# Patient Record
Sex: MALE | Race: WHITE
[De-identification: names, ages, dates, MRNs, and addresses within clinical notes are randomized per-mention and may not be internally consistent; named-entity substitution may affect disease eponyms.]

---

## 2017-06-07 NOTE — P.GSHP
History of Present Illness


H&P Date: 06/07/17


Chief Complaint: Left inguinal hernia





This is a 66-year-old male who's had complaints of left groin pain.  He was 

seen Liu found to have a left inguinal hernia.





- Constitutional


Constitutional: Reports as per HPI





Past Medical History


Past Medical History: Hyperlipidemia, Hypertension


Additional Past Medical History / Comment(s): HERNIA


History of Any Multi-Drug Resistant Organisms: None Reported


Past Surgical History: Back Surgery, Hernia Repair, Orthopedic Surgery


Additional Past Surgical History / Comment(s): HERNIA REPAIR X 2.  COLONOSCOPY.

  EGD.  L4-L5 WITH RODS AND SCREWS.  BILAT ELBOW REPAIR


Past Anesthesia/Blood Transfusion Reactions: Motion Sickness


Past Psychological History: No Psychological Hx Reported


Smoking Status: Never smoker


Past Alcohol Use History: Occasional


Past Drug Use History: None Reported





- Past Family History


  ** Mother


Family Medical History: Deep Vein Thrombosis (DVT)





  ** Father


Family Medical History: Cancer





Medications and Allergies


 Home Medications











 Medication  Instructions  Recorded  Confirmed  Type


 


Lisinopril-Hctz 20-25 mg 1 tab PO DAILY 06/06/17 06/06/17 History





[Zestoretic 20-25]    


 


Pravastatin Sodium [Pravachol] 40 mg PO DAILY 06/06/17 06/06/17 History











 Allergies











Allergy/AdvReac Type Severity Reaction Status Date / Time


 


No Known Allergies Allergy   Verified 06/07/17 12:03














Surgical - Exam


 Vital Signs











Temp Pulse Resp BP


 


 97.8 F   50 L  16   124/75 


 


 06/07/17 12:27  06/07/17 12:27  06/07/17 12:27  06/07/17 12:27














- General


well developed, no distress





- Eyes


PERRL





- ENT


normal pinna





- Neck


no masses





- Respiratory


normal expansion





- Cardiovascular


Rhythm: regular





- Abdomen


Abdomen: soft, non tender


Hernia: inguinal (Reducible left inguinal hernia)





Results





- Labs





 06/07/17 12:20


 Diabetes panel











  06/07/17 Range/Units





  12:20 


 


Potassium  4.8  (3.5-5.1)  mmol/L








 Pituitary panel











  06/07/17 Range/Units





  12:20 


 


Potassium  4.8  (3.5-5.1)  mmol/L








 Adrenal panel











  06/07/17 Range/Units





  12:20 


 


Potassium  4.8  (3.5-5.1)  mmol/L














Assessment and Plan


Plan: 





Left renal hernia.  We'll perform laparoscopic robotic-assisted repair.

## 2017-06-07 NOTE — P.OP
Date of Procedure: 06/07/17


Preoperative Diagnosis: 


Left inguinal hernia


Postoperative Diagnosis: 


Umbilical hernia





Left inguinal hernia


Procedure(s) Performed: 


Laparoscopic robotic system repair of left inguinal hernia





Laparoscopic repair of umbilical hernia


Implants: 





Anesthesia: DEXTERA


Surgeon: Arie Lund


Estimated Blood Loss (ml): 5


Pathology: none sent


Condition: stable


Disposition: PACU


Indications for Procedure: 





Operative Findings: 





Description of Procedure: 


The patient's placed on the operating table in the supine position.  The 

patient received general anesthesia.  The patient's abdomen was prepped and 

draped in usual sterile fashion.  The skin was anesthetized 1% local Xylocaine 

at the incision sites.  Using an 11 blade a skin incision was made at the 

umbilicus.  There was a small local hernia seen.  The fascia was grasped with a 

Jose and then the peritoneal cavity was entered with the Veress needle.  

Position of the Veress needle was confirmed with a positive drop test.  After 

adequate insufflation a 5 mm trocar was placed into the peritoneal cavity.


The Laparoscope was placed the peritoneal cavity.  And a robotic 8 mm trocar 

was placed in the right lateral position and then another 8 mm robotic trochars 

placed in the left lateral position.  The original 5 mm trocar was exchanged 

for a 12 mm trocar.  The patient was placed in reverse Trendelenburg and then 

the patient was docked to the robot.





Next the peritoneum over top of the hernia was incised and then using blunt and 

sharp dissection and electrocautery the hernia sac was dissected free from the 

floor of the inguinal canal.  The hernia sac was completely reduced into the 

peritoneal cavity.  And then using the Pro  mesh the hernia was repaired.  

The peritoneum was then sutured with 20V lock suture.  The patient was then 

undocked the robot.  The needle was withdrawn from the peritoneal cavity.  The 

umbilical hernia/ trocar site was closed with 0 Ethibond suture.  The skin was 

closed interrupted 3-0 Monocryl suture.  Dermabond dressing was applied.  

Patient was sent to recovery in stable condition.

## 2017-06-09 NOTE — ED
Abdominal Pain HPI





- General


Source: patient, family


Mode of arrival: ambulatory


Limitations: no limitations





<Bacilio Salazar - Last Filed: 06/09/17 22:53>





<Raymundo Brand - Last Filed: 06/10/17 00:27>





- General


Chief Complaint: Abdominal Pain


Stated Complaint: Post op hernia pain


Time Seen by Provider: 06/09/17 21:21





- History of Present Illness


Initial Comments: 





This 66-year-old white male presents with a complaint of some abdominal 

cramping.  He feels very constipated.  He states that he has not had a bowel 

movement in 3 days.  He just had hernia surgery 2 days ago.  He's been taking 

some Norco for post surgical pain.  He has tried some prune juice as well as 

some fruit and sauerkraut but denies taking any laxatives or other medication 

for constipation.  He denies any history of problems with his bowel movements 

other than one time previously after he had back surgery.  He has had some 

minimal nausea but no vomiting.  There is been no fevers or chills.  He denies 

any urinary symptoms.  He apparently had an umbilical hernia repaired as well 

as an inguinal hernia.  No other complaints or modifying factors.  This was 

done at our hospital by Dr. Lund. (Bacilio Salazar)





- Related Data


 Home Medications











 Medication  Instructions  Recorded  Confirmed


 


Lisinopril-Hctz 20-25 mg 1 tab PO DAILY 06/06/17 06/09/17





[Zestoretic 20-25]   


 


Pravastatin Sodium [Pravachol] 40 mg PO DAILY 06/06/17 06/09/17


 


HYDROcodone/APAP 7.5-325MG [Norco 1 tab PO Q4H PRN 06/09/17 06/09/17





7.5]   


 


Omeprazole 40 mg PO DAILY 06/09/17 06/09/17








 Previous Rx's











 Medication  Instructions  Recorded


 


Docusate [Colace] 100 mg PO BID #20 capsule 06/07/17











 Allergies











Allergy/AdvReac Type Severity Reaction Status Date / Time


 


No Known Allergies Allergy   Verified 06/09/17 21:29














Review of Systems


ROS Other: All systems not noted in ROS Statement are negative.





<Bacilio aSlazar - Last Filed: 06/09/17 22:53>


ROS Other: All systems not noted in ROS Statement are negative.





<Raymundo Brand - Last Filed: 06/10/17 00:27>


ROS Statement: 


Those systems with pertinent positive or pertinent negative responses have been 

documented in the HPI.








Past Medical History


Past Medical History: Hyperlipidemia, Hypertension


Additional Past Medical History / Comment(s): HERNIA


History of Any Multi-Drug Resistant Organisms: None Reported


Past Surgical History: Back Surgery, Hernia Repair, Orthopedic Surgery


Additional Past Surgical History / Comment(s): HERNIA REPAIR X 2.  COLONOSCOPY.

  EGD.  L4-L5 WITH RODS AND SCREWS.  BILAT ELBOW REPAIR


Past Anesthesia/Blood Transfusion Reactions: Motion Sickness


Past Psychological History: No Psychological Hx Reported


Smoking Status: Never smoker


Past Alcohol Use History: Occasional


Past Drug Use History: None Reported





- Past Family History


  ** Mother


Family Medical History: Deep Vein Thrombosis (DVT)





  ** Father


Family Medical History: Cancer





<Bacilio Salazar - Last Filed: 06/09/17 22:53>





General Exam


Limitations: no limitations





<Bacilio Salazar - Last Filed: 06/09/17 22:53>





<Raymundo Brand - Last Filed: 06/10/17 00:27>





- General Exam Comments


Initial Comments: 





GENERAL: The patient is well nourished and well hydrated. 


VITAL SIGNS: Heart rate, blood pressure, respiratory rate reviewed as recorded 

in nurse's notes. 


EYES: Pupils are round and reactive. Extraocular movements are intact. No 

conjunctival / lid redness or swelling. 


ENT: No external evidence of injury, swelling, or ecchymosis. Airway is patent. 

Throat is clear. 


NECK: Nontender. No swelling or evidence of injury. No subcutaneous emphysema. 

Trachea is midline. No thyroid mass. 


HEART: Regular rate and rhythm. Good peripheral pulses. 


LUNGS/CHEST: Breath sounds clear and equal bilaterally. No rales, rhonchi, or 

wheezes. No ecchymosis, subcutaneous emphysema, or tenderness. 


ABDOMEN: Is some mild tenderness present diffusely throughout the abdomen.  

There is well-healing surgical scars noted horizontally 3.  There are 2 

lateral scars as well as one at the umbilicus.  There is some mild associated 

erythema around the umbilical incision.  There is no drainage noted.  No 

palpable masses or organomegaly. No peritoneal signs.


EXTREMITIES: No extremity tenderness. Normal muscle tone and function. No 

thoracolumbar tenderness. 


NEUROLOGIC: Sensation is grossly intact. Cranial nerve exam reveals face is 

symmetrical, tongue is midline, speech is clear. 


SKIN: No abrasions or ecchymosis is noted. No induration or masses noted. 


PSYCHIATRIC: Alert and oriented. Appropriate behavior and judgment. (Bcailio Salazar)





Medical Decision Making





- Lab Data


Result diagrams: 


 06/09/17 22:00





 06/09/17 22:00





<Bacilio Salazar - Last Filed: 06/09/17 22:53>





- Lab Data


Result diagrams: 


 06/09/17 22:00





 06/09/17 22:00





<Raymundo Brand - Last Filed: 06/10/17 00:27>





- Medical Decision Making





The patient was seen and examined.  All diagnostics were reviewed.  An enema, 

magnesium citrate, Dulcolax are ordered.  He refuses any pain medications at 

this time.  The laboratory is reviewed and is unremarkable.  A computed 

tomography scan of the abdomen and pelvis is currently pending.  Further care 

will be passed on to oncoming physician. (Bacilio Salazar)





- Lab Data





 Lab Results











  06/09/17 06/09/17 06/09/17 Range/Units





  22:00 22:00 22:00 


 


WBC   8.8   (3.8-10.6)  k/uL


 


RBC   4.31   (4.30-5.90)  m/uL


 


Hgb   14.3   (13.0-17.5)  gm/dL


 


Hct   42.0   (39.0-53.0)  %


 


MCV   97.5   (80.0-100.0)  fL


 


MCH   33.1   (25.0-35.0)  pg


 


MCHC   34.0   (31.0-37.0)  g/dL


 


RDW   12.9   (11.5-15.5)  %


 


Plt Count   204   (150-450)  k/uL


 


Neutrophils %   79   %


 


Lymphocytes %   13   %


 


Monocytes %   5   %


 


Eosinophils %   2   %


 


Basophils %   0   %


 


Neutrophils #   6.9   (1.3-7.7)  k/uL


 


Lymphocytes #   1.1   (1.0-4.8)  k/uL


 


Monocytes #   0.4   (0-1.0)  k/uL


 


Eosinophils #   0.2   (0-0.7)  k/uL


 


Basophils #   0.0   (0-0.2)  k/uL


 


PT     (9.0-12.0)  sec


 


INR     (<1.1)  


 


APTT     (22.0-30.0)  sec


 


Sodium  139    (137-145)  mmol/L


 


Potassium  3.7    (3.5-5.1)  mmol/L


 


Chloride  104    ()  mmol/L


 


Carbon Dioxide  26    (22-30)  mmol/L


 


Anion Gap  9    mmol/L


 


BUN  14    (9-20)  mg/dL


 


Creatinine  0.77    (0.66-1.25)  mg/dL


 


Est GFR (MDRD) Af Amer  >60    (>60 ml/min/1.73 sqM)  


 


Est GFR (MDRD) Non-Af  >60    (>60 ml/min/1.73 sqM)  


 


Glucose  107 H    (74-99)  mg/dL


 


Plasma Lactic Acid Hernan    1.0  (0.7-2.0)  mmol/L


 


Calcium  9.3    (8.4-10.2)  mg/dL


 


Total Bilirubin  0.6    (0.2-1.3)  mg/dL


 


AST  20    (17-59)  U/L


 


ALT  24    (21-72)  U/L


 


Alkaline Phosphatase  70    ()  U/L


 


Total Protein  6.7    (6.3-8.2)  g/dL


 


Albumin  3.8    (3.5-5.0)  g/dL


 


Amylase  43    ()  U/L


 


Lipase  34    ()  U/L


 


Urine Color     


 


Urine Appearance     (Clear)  


 


Urine pH     (5.0-8.0)  


 


Ur Specific Gravity     (1.001-1.035)  


 


Urine Protein     (Negative)  


 


Urine Glucose (UA)     (Negative)  


 


Urine Ketones     (Negative)  


 


Urine Blood     (Negative)  


 


Urine Nitrite     (Negative)  


 


Urine Bilirubin     (Negative)  


 


Urine Urobilinogen     (<2.0)  mg/dL


 


Ur Leukocyte Esterase     (Negative)  














  06/09/17 06/09/17 Range/Units





  22:00 23:30 


 


WBC    (3.8-10.6)  k/uL


 


RBC    (4.30-5.90)  m/uL


 


Hgb    (13.0-17.5)  gm/dL


 


Hct    (39.0-53.0)  %


 


MCV    (80.0-100.0)  fL


 


MCH    (25.0-35.0)  pg


 


MCHC    (31.0-37.0)  g/dL


 


RDW    (11.5-15.5)  %


 


Plt Count    (150-450)  k/uL


 


Neutrophils %    %


 


Lymphocytes %    %


 


Monocytes %    %


 


Eosinophils %    %


 


Basophils %    %


 


Neutrophils #    (1.3-7.7)  k/uL


 


Lymphocytes #    (1.0-4.8)  k/uL


 


Monocytes #    (0-1.0)  k/uL


 


Eosinophils #    (0-0.7)  k/uL


 


Basophils #    (0-0.2)  k/uL


 


PT  10.6   (9.0-12.0)  sec


 


INR  1.0   (<1.1)  


 


APTT  24.6   (22.0-30.0)  sec


 


Sodium    (137-145)  mmol/L


 


Potassium    (3.5-5.1)  mmol/L


 


Chloride    ()  mmol/L


 


Carbon Dioxide    (22-30)  mmol/L


 


Anion Gap    mmol/L


 


BUN    (9-20)  mg/dL


 


Creatinine    (0.66-1.25)  mg/dL


 


Est GFR (MDRD) Af Amer    (>60 ml/min/1.73 sqM)  


 


Est GFR (MDRD) Non-Af    (>60 ml/min/1.73 sqM)  


 


Glucose    (74-99)  mg/dL


 


Plasma Lactic Acid Hernan    (0.7-2.0)  mmol/L


 


Calcium    (8.4-10.2)  mg/dL


 


Total Bilirubin    (0.2-1.3)  mg/dL


 


AST    (17-59)  U/L


 


ALT    (21-72)  U/L


 


Alkaline Phosphatase    ()  U/L


 


Total Protein    (6.3-8.2)  g/dL


 


Albumin    (3.5-5.0)  g/dL


 


Amylase    ()  U/L


 


Lipase    ()  U/L


 


Urine Color   Light Yellow  


 


Urine Appearance   Clear  (Clear)  


 


Urine pH   6.5  (5.0-8.0)  


 


Ur Specific Gravity   >1.050 H  (1.001-1.035)  


 


Urine Protein   Trace H  (Negative)  


 


Urine Glucose (UA)   Negative  (Negative)  


 


Urine Ketones   Negative  (Negative)  


 


Urine Blood   Negative  (Negative)  


 


Urine Nitrite   Negative  (Negative)  


 


Urine Bilirubin   Negative  (Negative)  


 


Urine Urobilinogen   <2.0  (<2.0)  mg/dL


 


Ur Leukocyte Esterase   Negative  (Negative)  














Disposition





<Bacilio Salazar - Last Filed: 06/09/17 22:53>


Time of Disposition: 00:27





<Raymundo Brand - Last Filed: 06/10/17 00:27>


Clinical Impression: 


 Abdominal pain, Constipation, H/O hernia repair





Disposition: HOME SELF-CARE


Instructions:  Abdominal Pain (ED), Constipation (ED)


Referrals: 


Laurent Calderon DO [Primary Care Provider] - 1-2 days

## 2018-06-26 NOTE — US
EXAMINATION TYPE: US venous doppler duplex LE LT

 

DATE OF EXAM: 6/26/2018 5:36 PM

 

COMPARISON: NONE

 

CLINICAL HISTORY: LLE R22.42 Swelling Left lower limb. Lump on left calf.

 

SIDE PERFORMED: Left  

 

TECHNIQUE:  The lower extremity deep venous system is examined utilizing real time linear array sonog
justo with graded compression, doppler sonography and color-flow sonography.

 

VESSELS IMAGED:

External Iliac Vein (EIV)

Common Femoral Vein

Deep Femoral Vein

Greater Saphenous Vein *

Femoral Vein

Popliteal Vein

Small Saphenous Vein *

Proximal Calf Veins

(* superficial vessels)

 

Left Leg:  Negative for DVT. Varicosities seen left calf area of lump. 

 

 

 

IMPRESSION:  No evidence of deep venous thrombosis. There are superficial varicosities in the area of
 the lump on the left calf.

## 2018-12-12 NOTE — OP
OPERATIVE REPORT



DATE OF SURGERY:

12/12/2018.



PROCEDURE PERFORMED:

Phacoemulsification of cataract and intraocular lens implant with the left eye.



PREOPERATIVE DIAGNOSES:

Nuclear sclerosis, cortical sclerosis, posterior subcapsular cataract.



POSTOPERATIVE DIAGNOSES:

Nuclear sclerosis, cortical sclerosis, posterior subcapsular cataract.



SURGEON:

Dr. Abdiel Sood.



ANESTHESIA:

Topical.



ESTIMATED BLOOD LOSS:

None.



SPECIMEN:

Taken none.



NARRATIVE:

After obtaining the appropriate consent, the patient was brought to the 
operating room.

There he was placed under cardiac monitoring, prepped and draped in the usual 
sterile

manner.  He was approached from the left temporal side using previously acquired

corneal topography information.  The axis of 157 degrees was identified and 
marked with

a corneal marking instrument.  Once this was completed, a 5.5 mm Suraj ring 
was placed

on the patient's central cornea.  At the 5 o'clock position, an MVR blade was 
used to

create a paracentesis port.  Through this opening 1% Xylocaine MPF 50 50 mix 
with

balanced salt solution was injected into the anterior chamber.  This was 
followed by

stabilization of the anterior chamber with Amvisc.  At the 3 o'clock position, 
a 2.7 mm

eliz keratome was used to create a self-sealing corneal flap incision in a

Langerman's  fashion.  Through this opening, a cystotome was used to begin a 
continuous

tear capsulorrhexis which was completed using the Utrata forceps.  Care was 
taken to

ensure that the diameter of the rhexis was at least the size of the ring placed 
on the

patient's cornea.  Hydrodissection and hydrodelineation of the lens was 
accomplished

with balanced salt solution.  Phacoemulsification lens utilizing phaco chop was

accomplished at 12.77 seconds at 6% power.  Additional Xylocaine MPF was 
instilled into

the patient's eye and removal of the remaining cortex was accomplished under 
irrigation

aspiration along with additional care polishing  the posterior capsule.  Amvisc 
was

then placed into the patient's eye and using the Stewart-Monica capsule 
polishers,  the

entire underside of the anterior capsule was cleaned from the equator to the 
edge of

the capsule, this was accomplished 360 degrees without any difficulty.  The 
internal

incision was enlarged slightly with a keratome and a Bausch and Lomb crystal 
lens true

line toric model BL1UT 18.0 diopter by 1.25 diopter intraocular lens was then 
placed

into the capsular bag without difficulty.  The remaining viscoelastic was 
removed from

the capsular bag and the lens was rotated to correspond to the previously placed

astigmatism marks placed on the patient's limbus.  Once the lens was secured, 
the

anterior chamber was then also cleared of any remaining viscoelastic.  The eye 
was

brought to normal intraocular pressures through the paracentesis port and to 
ensure

watertight integrity during the initial stages of healing, ReSure was placed on 
the

patient's eye, maintaining a watertight integrity and there were no 
complications.  He

then received 2 drops of 1/2 percent timolol followed by 2 drops of 1% atropine 
and 2

drops of moxifloxacin.  He was then lightly patched and shielded in the usual 
manner.

There were no complications from the procedure.  He tolerated the procedure 
well and

was returned to outpatient recovery in good condition.





MMKRISTIE / MALIK: [id number] / Job#: [id number]

MARCO

## 2018-12-12 NOTE — P.OP
Date of Procedure: 12/12/18


Preoperative Diagnosis: 


NS & CS & PSC & regular astigmatism


Postoperative Diagnosis: 


same


Procedure(s) Performed: 


PIOL, OS


Anesthesia: MAC


Surgeon: Abdiel Sood


Pathology: none sent


Condition: stable


Disposition: same day


Indications for Procedure: 


Blrru vision


Operative Findings: 


No complications

## 2019-07-10 NOTE — P.OP
Date of Procedure: 07/10/19


Preoperative Diagnosis: 


NS & CS & PSC


Postoperative Diagnosis: 


same


Procedure(s) Performed: 


PIOL, OD


Implants: 


AO1UV 19.25


Anesthesia: MAC


Surgeon: Abdiel Sood


Pathology: none sent


Condition: stable


Disposition: same day


Indications for Procedure: 


blurry vision


Operative Findings: 


No complications

## 2019-07-10 NOTE — OP
OPERATIVE REPORT



DATE OF SURGERY:

07/10/2019



PROCEDURE:

Phacoemulsification of cataract and intraocular lens implant of the right eye.



PREOPERATIVE DIAGNOSES:

1. Nuclear sclerosis, right eye.

2. Cortical sclerosis, right eye.

3. Posterior subcapsular cataract, right eye.



POSTOPERATIVE DIAGNOSES:

1. Nuclear sclerosis, right eye.

2. Cortical sclerosis, right eye.

3. Posterior subcapsular cataract, right eye.



NARRATIVE:

After obtaining the appropriate consent, the patient was brought to the operating room.

There the patient was placed under cardiac monitoring, prepped and draped in the usual

sterile manner.  The patient was approached from the right temporal side.  The _____ mm

Suraj ring inked in gentian violet was placed centrally on the cornea.  At the 11

o'clock position, a 1.1 mm keratome was used to create a paracentesis port.  Through

this opening, 1% Xylocaine MPF 50/50 mix with balanced salt solution was injected into

the anterior chamber.  This was followed by stabilization of the anterior chamber with

Amvisc viscoelastic.  At the 9 o'clock position, a 2.75 mm eliz keratome was used to

create a self-scaling corneal flap incision in a Langerman fashion.  Through this

opening, a cystotome was introduced to begin a continuous tear capsulorrhexis which was

completed using the Utrata forceps.  Care was taken to ensure that the capsulorrhexis

was at least the size of the martha on the anterior cornea.  Hydrodissection and

hydrodelineation of the lens was accomplished with balanced salt solution.

Phacoemulsification of the lens utilizing Phaco Chop was accomplished in 9.77 seconds

at 8% power.  Addition Xylocaine MPF was instilled into the anterior chamber.  This was

followed by removal of the remaining cortex under irrigation and aspiration along with

careful polishing of the posterior capsule in a capsule vacuum mode.  Additional Amvisc

viscoelastic was then used to stabilize the capsular bag, and the Bausch and Lomb

Crystalens model AO1UV 19.25 diopter intraocular lens was injected into the capsular

bag without difficult.  The lens was rotated 270 degrees so that the haptics resided at

the 6 and 12 o'clock positions, and all remaining viscoelastic was then removed from

within the capsular bag and around the anterior chamber.  The eye was brought to normal

intraocular pressure through the paracentesis port along with slight hydration of the

incision sites.  Watertight integrity was confirmed using a fluorescein strip.  The

patient then received 2 drops of 0.5% timolol followed by 2 drops of Vigamox and 2

drops of 1% atropine.  The patient was then lightly patched and shielded in the usual

manner. There was no complications from the procedure.  The patient tolerated the

procedure well and was returned to outpatient recovery in good  condition.





MMODL / BEEN: [id number] / Job#: [id number]

## 2021-07-19 ENCOUNTER — HOSPITAL ENCOUNTER (OUTPATIENT)
Dept: HOSPITAL 47 - ORWHC2ENDO | Age: 71
Discharge: HOME | End: 2021-07-19
Attending: INTERNAL MEDICINE
Payer: OTHER GOVERNMENT

## 2021-07-19 VITALS — SYSTOLIC BLOOD PRESSURE: 117 MMHG | RESPIRATION RATE: 18 BRPM | HEART RATE: 53 BPM | DIASTOLIC BLOOD PRESSURE: 66 MMHG

## 2021-07-19 VITALS — TEMPERATURE: 98.4 F

## 2021-07-19 VITALS — BODY MASS INDEX: 29.6 KG/M2

## 2021-07-19 DIAGNOSIS — K64.8: ICD-10-CM

## 2021-07-19 DIAGNOSIS — Z86.010: ICD-10-CM

## 2021-07-19 DIAGNOSIS — I10: ICD-10-CM

## 2021-07-19 DIAGNOSIS — K57.90: ICD-10-CM

## 2021-07-19 DIAGNOSIS — Z79.899: ICD-10-CM

## 2021-07-19 DIAGNOSIS — G47.33: ICD-10-CM

## 2021-07-19 DIAGNOSIS — E78.5: ICD-10-CM

## 2021-07-19 DIAGNOSIS — Z12.11: Primary | ICD-10-CM

## 2021-07-19 DIAGNOSIS — K21.9: ICD-10-CM

## 2021-07-19 PROCEDURE — 45378 DIAGNOSTIC COLONOSCOPY: CPT

## 2021-07-19 NOTE — P.PCN
Date of Procedure: 07/19/21


Description of Procedure: 





BRIEF HISTORY: 


Patient is a 70-year-old male presenting for outpatient colonoscopy for history 

of colon polyps.  The patient reports multiple colonoscopies in the past.  He 

believes his last was between 5-10 years ago but is unsure of the day.  He does 

report colon cancer in his fall.  No change in bowel habits.





PROCEDURE PERFORMED: 


Colonoscopy. 





PREOPERATIVE DIAGNOSIS: 


History of colon polyps, patient believes last colonoscopy 5-10 years ago, he 

reports family history of colon cancer in his father. 





ESTIMATED BLOOD LOSS: 


Minimal.





IV sedation per Anesthesia. 





PROCEDURE: 


After informed consent was obtained, the patient, was brought into the endoscopy

unit. IV sedation was administered by Anesthesia under continuous monitoring.  

Digital rectal examination was normal. Initially the Olympus CF-190 flexible 

video colonoscope was then inserted in the rectum, gradually advanced into the 

cecum without any difficulty. Careful examination was performed as the scope was

gradually being withdrawn. Ileocecal valve and the appendiceal orifice were v

isualized and appeared normal.  Prep was excellent. Mucosa of the cecum, 

ascending colon, transverse colon, descending colon, sigmoid colon, and rectum 

appeared normal, with a few scattered diverticula noted in the sigmoid colon. 

Retroflexion was performed in the rectum and no lesions were seen and internal 

hemorrhoids noted . The patient tolerated the procedure well. 





IMPRESSION: 


Normal-appearing colon from rectum to cecum.


Mild sigmoid diverticulosis.


Internal hemorrhoids.





RECOMMENDATIONS:  


Findings of this examination were discussed with the patient [].

## 2021-07-23 ENCOUNTER — HOSPITAL ENCOUNTER (OUTPATIENT)
Dept: HOSPITAL 47 - RADMRIMAIN | Age: 71
Discharge: HOME | End: 2021-07-23
Attending: NURSE PRACTITIONER
Payer: COMMERCIAL

## 2021-07-23 DIAGNOSIS — R60.0: Primary | ICD-10-CM

## 2021-07-23 DIAGNOSIS — Z98.890: ICD-10-CM

## 2021-07-24 NOTE — MR
EXAMINATION TYPE: MR elbow RT wo con

 

DATE OF EXAM: 7/23/2021

 

COMPARISON: None

 

HISTORY: Rt elbow pain x 3 months, Prior surgery per patient

 

Multiplanar multiecho imaging of the right elbow with no contrast.

 

FINDINGS:

The biceps tendon is intact. Brachialis tendon is intact. There is a 8 mm area of edema involving the
 medial humeral condyle at the articular surface with the ulna. The proximal ulna is intact. There is
 some increased signal in the triceps tendon. I see no fracture line. The radial head is intact. Ther
e is no significant elbow joint effusion. There is some fluid around the triceps tendon.

 

IMPRESSION:

There is some edema in the triceps tendon consistent with partial tear with surrounding fluid.

 

No fracture seen. Small bone bruise in the medial humeral condyle.

## 2021-07-30 ENCOUNTER — HOSPITAL ENCOUNTER (OUTPATIENT)
Dept: HOSPITAL 47 - RADBDWWP | Age: 71
Discharge: HOME | End: 2021-07-30
Attending: NURSE PRACTITIONER
Payer: COMMERCIAL

## 2021-07-30 DIAGNOSIS — M06.9: Primary | ICD-10-CM

## 2021-07-30 DIAGNOSIS — Z98.1: ICD-10-CM

## 2021-07-30 PROCEDURE — 77080 DXA BONE DENSITY AXIAL: CPT

## 2021-07-30 NOTE — BD
EXAMINATION TYPE: Axial Bone Density

 

DATE OF EXAM: 7/30/2021

 

COMPARISON: NONE

 

CLINICAL HISTORY:

 

Height:  64.5

Weight:  170.2

 

FRAX RISK QUESTIONS:

Alcohol (3 or more units per day):  no

Family History (Parent hip fracture):  no

Glucocorticoids (More than 3mos):  no

           (Ex: prednisone, prednisolone, methylprednisolone, dexamethasone, and hydrocortisone).    
     

History of Fracture in Adulthood: yes

Secondary Osteoporosis:

  1.  Type 1 Diabetes: no

  2.  Hyperthyroidism: no

  3.  Menopause before 45: N/A

  4.  Malnutrition: no

  5.  Chronic liver disease: no

Rheumatoid Arthritis: yes

Current Tobacco Use: no

 

RISK FACTORS 

HISTORY OF: 

Surgery to Spine/Hip(right/left)/Wrist (right/left): fusion lumbar spine

When: 7 years ago

Family History of Osteoporosis: no

Active: yes

Diet low in dairy products/other sources of calcium:  no

Lost more than 2 inches in height since high school: yes

 

MEDICATIONS: HCTZ, amlodipine, atorvastatin, vitamins

 

 

Additional History:

 

 

EXAM MEASUREMENTS: 

Bone mineral densitometry was performed using the [employer] System.

 

Bone mineral density about the R hip (g/cm2): 0.819

Bone mineral density about the L hip (g/cm2): 0.848

T Score values are as follows:

-----R Neck: -1.6

-----L Neck: -1.4

-----R Total: -1.1

-----L Total: 0.1

Bone mineral density : baseline

 

Bone mineral density about the L Wrist (g/cm2): 0.730

T Score values are as follows: 

-----Dist. R+U: 0.3

-----Prox. R+U: 0.0

-----Radius total: -0.3

Bone mineral density : baseline

 

IMPRESSION:

No evidence for osteoporosis or osteopenia.

 

NOTE:  T-SCORE=SD OF THE YOUNG ADULT MEAN.

## 2021-12-02 ENCOUNTER — HOSPITAL ENCOUNTER (EMERGENCY)
Dept: HOSPITAL 47 - EC | Age: 71
Discharge: HOME | End: 2021-12-02
Payer: OTHER GOVERNMENT

## 2021-12-02 VITALS
RESPIRATION RATE: 17 BRPM | HEART RATE: 68 BPM | DIASTOLIC BLOOD PRESSURE: 86 MMHG | SYSTOLIC BLOOD PRESSURE: 117 MMHG | TEMPERATURE: 98.7 F

## 2021-12-02 DIAGNOSIS — E78.5: ICD-10-CM

## 2021-12-02 DIAGNOSIS — M19.90: ICD-10-CM

## 2021-12-02 DIAGNOSIS — Z85.828: ICD-10-CM

## 2021-12-02 DIAGNOSIS — U07.1: Primary | ICD-10-CM

## 2021-12-02 DIAGNOSIS — I10: ICD-10-CM

## 2021-12-02 DIAGNOSIS — K21.9: ICD-10-CM

## 2021-12-02 PROCEDURE — 96360 HYDRATION IV INFUSION INIT: CPT

## 2021-12-02 PROCEDURE — 99283 EMERGENCY DEPT VISIT LOW MDM: CPT

## 2021-12-02 PROCEDURE — 87635 SARS-COV-2 COVID-19 AMP PRB: CPT

## 2021-12-02 NOTE — ED
URI HPI





- General


Chief Complaint: Upper Respiratory Infection


Stated Complaint: Fever, Covid+


Time Seen by Provider: 21 05:20


Source: patient


Mode of arrival: ambulatory


Limitations: no limitations





- History of Present Illness


MD Complaint: fever, cough


Onset/Timin


-: days(s)


Consistency: constant


Improves With: nothing


Context: sick contacts


Associated Symptoms: fever, cough


Treatments Prior to Arrival: Acetaminophen





- Related Data


                                Home Medications











 Medication  Instructions  Recorded  Confirmed


 


Lisinopril-Hctz 20-25 mg 1 tab PO QAM 17





[Zestoretic 20-25]   


 


Atorvastatin [Lipitor] 40 mg PO DAILY 12/10/18 07/19/21


 


Cholecalciferol [Vitamin D3] 5,000 unit PO DAILY 12/10/18 07/19/21


 


Multivitamin [Men's Multi-Vitamin] 1 each PO DAILY 12/10/18 07/19/21


 


Biotin 10,000 mcg PO DAILY 07/15/21 07/19/21


 


Cyanocobalamin [Vitamin B-12] 500 mcg PO DAILY 07/15/21 07/19/21


 


Omega-3 Fatty Acids [Omega-3] 2,000 mg PO DAILY 07/15/21 07/19/21


 


Turmeric Root Extract [Turmeric] 2,000 mg PO DAILY 07/15/21 07/19/21


 


amLODIPine BESYLATE [Norvasc] 2.5 mg PO DAILY 07/15/21 07/19/21











                                    Allergies











Allergy/AdvReac Type Severity Reaction Status Date / Time


 


No Known Allergies Allergy   Verified 21 05:07














Review of Systems


ROS Statement: 


Those systems with pertinent positive or pertinent negative responses have been 

documented in the HPI.





ROS Other: All systems not noted in ROS Statement are negative.


Constitutional: Reports: fever.  Denies: chills, weakness


ENT: Denies: throat pain


Respiratory: Reports: cough.  Denies: dyspnea, wheezes


Cardiovascular: Denies: chest pain, palpitations, edema


Gastrointestinal: Denies: abdominal pain, nausea, vomiting, diarrhea


Musculoskeletal: Denies: back pain


Skin: Denies: rash


Neurological: Denies: headache, weakness





Past Medical History


Past Medical History: Cancer, Eye Disorder, GERD/Reflux, Hearing Disorder / 

Deafness, Hyperlipidemia, Hypertension, Osteoarthritis (OA)


Additional Past Medical History / Comment(s): hx. skin cancer, cataracts


History of Any Multi-Drug Resistant Organisms: None Reported


Past Surgical History: Back Surgery, Hernia Repair, Orthopedic Surgery


Additional Past Surgical History / Comment(s): HERNIA REPAIR X 2, right rotator 

cuff repair, cataract removed left eye, colonoscopy,EGD,.  L4-L5 WITH RODS AND 

SCREWS.  BILAT ELBOW REPAIR


Past Anesthesia/Blood Transfusion Reactions: Motion Sickness


Additional Past Anesthesia/Blood Transfusion Reaction / Comment(s): Daughter had

 strokes during open heart surgery.


Past Psychological History: No Psychological Hx Reported


Smoking Status: Never smoker


Past Alcohol Use History: None Reported


Past Drug Use History: None Reported





- Past Family History


  ** Mother


Family Medical History: Deep Vein Thrombosis (DVT)





  ** Father


Family Medical History: Cancer


Additional Family Medical History / Comment(s): lung cancer





General Exam


Limitations: no limitations


General appearance: alert, in no apparent distress


Head exam: Present: atraumatic, normocephalic


Eye exam: Present: normal appearance.  Absent: scleral icterus, conjunctival 

injection


ENT exam: Present: normal oropharynx


Neck exam: Present: normal inspection


Respiratory exam: Present: normal lung sounds bilaterally.  Absent: respiratory 

distress, wheezes, rales, rhonchi, stridor


Cardiovascular Exam: Present: regular rate, normal rhythm, normal heart sounds. 

 Absent: systolic murmur, diastolic murmur, rubs, gallop


GI/Abdominal exam: Present: soft.  Absent: distended, tenderness, guarding, 

rebound, rigid


Extremities exam: Present: normal inspection, normal capillary refill.  Absent: 

pedal edema, calf tenderness


Neurological exam: Present: alert


Skin exam: Present: warm, dry, intact, normal color.  Absent: rash





Course


                                   Vital Signs











  21





  05:03 05:38 06:45


 


Temperature 99.8 F H  98.4 F


 


Pulse Rate 67  66


 


Respiratory 22 15 18





Rate   


 


Blood Pressure 161/90  119/81


 


O2 Sat by Pulse 94 L  95





Oximetry   














Medical Decision Making





- Lab Data


                                   Lab Results











  21 Range/Units





  05:28 


 


Coronavirus (PCR)  Detected A  (Not Detectd)  














Disposition


Clinical Impression: 


 COVID-19





Disposition: HOME SELF-CARE


Condition: Good


Instructions (If sedation given, give patient instructions):  Coronavirus 

Disease 2019 (COVID-19)


Is patient prescribed a controlled substance at d/c from ED?: No


Referrals: 


[facility] [Primary Care Provider] - 1-2 days

## 2022-09-01 ENCOUNTER — HOSPITAL ENCOUNTER (OUTPATIENT)
Dept: HOSPITAL 47 - SLEEP | Age: 72
End: 2022-09-01
Attending: INTERNAL MEDICINE
Payer: OTHER GOVERNMENT

## 2022-09-01 DIAGNOSIS — I10: ICD-10-CM

## 2022-09-01 DIAGNOSIS — M06.9: ICD-10-CM

## 2022-09-01 DIAGNOSIS — E78.5: ICD-10-CM

## 2022-09-01 DIAGNOSIS — G47.33: Primary | ICD-10-CM

## 2022-09-01 DIAGNOSIS — Z85.828: ICD-10-CM

## 2022-09-01 PROCEDURE — 99211 OFF/OP EST MAY X REQ PHY/QHP: CPT

## 2022-09-01 NOTE — P.SLEEP
History of Present Illness


DATE: 09/01/2022





CONSULTATION/NEW PATIENT EVALUATION





HISTORY OF PRESENT ILLNESS/SLEEP-WAKE EVALUATION:   71year old  gentleman had 

been evaluated in the sleep center for possible obstructive sleep apnea hypopnea

syndrome.Patient has history of obstructive sleep apnea diagnosed in another 

institution about 10 years ago.  At that time he was started on treatment with 

CPAP but was not able to use a CPAP treatment.  





SLEEP SCHEDULE: Usually sleep schedule on [id number] PM to 58 AM], during days 

of from 11 PM to 7- 8 AM].





FALLING ASLEEP: Patient has problems with falling asleep, although no TV in 

bedroom].





DURING SLEEP:She snores and wakes up from sleep 3 times with 1 episode of 

nocturia. ] No history of hypnogogical hallucinations, sleep paralysis, or 

cataplexy.





DURING THE DAY/WAKE STATE: In the morning patient wake up tired, has 

difficulties to pay attention].  [place] sleepiness scale i 6]  Usually patient 

doesn't take naps].





PAST MEDICAL HISTORY: Hypertension, hyperlipidemia, rheumatoid arthritis, basal 

cell CA of the face[].





PAST SURGICAL HISTORY:Back surgery 2015[].





MEDICATIONS: Lisinopril 20 mg once a day, hydrochlorothiazide 25 mg once a day, 

amlodipine 5 mg once a day, atorvastatin 40 mg once a day, supplements vitamin D

 3, B12, multivitamins[].





SOCIAL HISTORY: Negative for[] smoking, alcohol consumption occasional.





FAMILY HISTORYHypertension, COPD[].





REVIEW OF SYSTEMSSnoring, multiple awakenings from sleep[]. No fevers. No double

 vision. No recent chest pain. No shortness of breath. No abdominal pain. No 

bleeding episodes. No blood in urine. No seizure episodes. 





PHYSICAL EXAMINATION: 


GENERAL: A pleasant patient without any distress. 


VITAL SIGNS: /83 , HR 64 , RR 16 , weight 163ds, height 5 ot3es, body mass

 index 28.4[] .


HEENT: PERRLA, EOMI. Evaluation of oropharynx showed tongue protrudes midline, 

low position of soft palate Mallampati[  4 .


NECK: Supple. No JVD. Thyroid is not palpable 16[] inches in circumference.


LUNGS: Clear to percussion and to auscultation. Good air exchange. No wheezing 

or rhonchi. 


HEART: S1, S2 regular. No murmurs, gallops or rubs. 


ABDOMEN: Soft and nontender. Bowel sounds are present. No organomegaly 

appreciated. 


EXTREMITIES: No clubbing or cyanosis.  ulnar deformity 


CNS: Awake, alert, and oriented x3. Cranial nerves 2 to 7 intact. There is no 

fasciculation or atrophy noted. No focal deficits observed. 





ASSESSMENT: 1 Snoring, multiple awakenings from sleep, extremely low position of

 soft palate Mallampati 4, history of obstructive sleep apnea hypopnea syndrome 

in the past.  Obstructive sleep apnea hypopnea syndrome[].


2 Hypertension[].


3rheumatoid arthritis[].


4 Hyperlipidemia[].


5history of basal cell CA over the face[].








PLAN: 


1.   Polysomnography for evaluation of patient's breathing during sleep. 


2.   CPAP/BiPAP titration if sleep study confirms obstructive sleep apnea-

hypopnea syndrome. 


3.   Preferable position during sleep on the side. 


4.   No driving if patient feels any sleepiness. Patient is aware of civil and 

criminal liability for unsafe driving. 


5.             Sleep hygiene with regular sleep time for at least 7.5-8 hours.


6.   Watching weight. 





Thank you very much for referring this patient for consultation.





Sincerely,











Dong Spring MD, PhD, FAASM.


Diplomat of American Board of Sleep Medicine,


Sleep Medicine Board by American Board of Medical Specialities


American Board of Internal Medicine


Medical Director of [facility] [place]

















Past Medical History


Past Medical History: Cancer, Eye Disorder, GERD/Reflux, Hearing Disorder / 

Deafness, Hyperlipidemia, Hypertension, Osteoarthritis (OA)


Additional Past Medical History / Comment(s): hx. skin cancer, cataracts


History of Any Multi-Drug Resistant Organisms: None Reported


Past Surgical History: Back Surgery, Hernia Repair, Orthopedic Surgery


Additional Past Surgical History / Comment(s): HERNIA REPAIR X 2, right rotator 

cuff repair, cataract removed left eye, colonoscopy,EGD,.  L4-L5 WITH RODS AND 

SCREWS.  BILAT ELBOW REPAIR


Past Anesthesia/Blood Transfusion Reactions: Motion Sickness


Additional Past Anesthesia/Blood Transfusion Reaction / Comment(s): Daughter had

 strokes during open heart surgery.


Past Psychological History: No Psychological Hx Reported


Smoking Status: Never smoker


Past Alcohol Use History: None Reported


Past Drug Use History: None Reported





- Past Family History


  ** Mother


Family Medical History: Deep Vein Thrombosis (DVT)





  ** Father


Family Medical History: Cancer


Additional Family Medical History / Comment(s): lung cancer





Medications and Allergies


                                Home Medications











 Medication  Instructions  Recorded  Confirmed  Type


 


Lisinopril-Hctz 20-25 mg 1 tab PO QAM 06/06/17 07/19/21 History





[Zestoretic 20-25]    


 


Atorvastatin [Lipitor] 40 mg PO DAILY 12/10/18 07/19/21 History


 


Cholecalciferol [Vitamin D3] 5,000 unit PO DAILY 12/10/18 07/19/21 History


 


Multivitamin [Men's Multi-Vitamin] 1 each PO DAILY 12/10/18 07/19/21 History


 


Biotin 10,000 mcg PO DAILY 07/15/21 07/19/21 History


 


Cyanocobalamin [Vitamin B-12] 500 mcg PO DAILY 07/15/21 07/19/21 History


 


Omega-3 Fatty Acids [Omega-3] 2,000 mg PO DAILY 07/15/21 07/19/21 History


 


Turmeric Root Extract [Turmeric] 2,000 mg PO DAILY 07/15/21 07/19/21 History


 


amLODIPine BESYLATE [Norvasc] 2.5 mg PO DAILY 07/15/21 07/19/21 History








                                    Allergies











Allergy/AdvReac Type Severity Reaction Status Date / Time


 


No Known Allergies Allergy   Verified 12/02/21 05:07














Sleep Note





- Sleep Note


Sleep Note: 


Temperature: 


Pulse Rate: 


Respiratory Rate: 


Blood Pressure: 


SpO2: 


Height: 


Weight: 


BMI: 


Neck Circumference:

## 2022-09-16 ENCOUNTER — HOSPITAL ENCOUNTER (EMERGENCY)
Dept: HOSPITAL 47 - EC | Age: 72
Discharge: HOME | End: 2022-09-16
Payer: OTHER GOVERNMENT

## 2022-09-16 VITALS
SYSTOLIC BLOOD PRESSURE: 133 MMHG | RESPIRATION RATE: 19 BRPM | HEART RATE: 67 BPM | DIASTOLIC BLOOD PRESSURE: 91 MMHG | TEMPERATURE: 98.7 F

## 2022-09-16 DIAGNOSIS — Z79.899: ICD-10-CM

## 2022-09-16 DIAGNOSIS — S01.21XA: Primary | ICD-10-CM

## 2022-09-16 DIAGNOSIS — E78.5: ICD-10-CM

## 2022-09-16 DIAGNOSIS — K21.9: ICD-10-CM

## 2022-09-16 DIAGNOSIS — I10: ICD-10-CM

## 2022-09-16 DIAGNOSIS — S09.90XA: ICD-10-CM
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
## 2022-09-16 DIAGNOSIS — M19.90: ICD-10-CM

## 2022-09-16 PROCEDURE — 70486 CT MAXILLOFACIAL W/O DYE: CPT

## 2022-09-16 PROCEDURE — 12013 RPR F/E/E/N/L/M 2.6-5.0 CM: CPT

## 2022-09-16 PROCEDURE — 72125 CT NECK SPINE W/O DYE: CPT

## 2022-09-16 PROCEDURE — 70450 CT HEAD/BRAIN W/O DYE: CPT

## 2022-09-16 PROCEDURE — 99284 EMERGENCY DEPT VISIT MOD MDM: CPT

## 2022-09-16 NOTE — ED
Fall HPI





- General


Chief Complaint: Fall


Stated Complaint: Fall


Time Seen by Provider: 09/16/22 03:13


Source: patient


Mode of arrival: ambulatory





- History of Present Illness


Initial Comments: 





This is a pleasant 71-year-old male walked out of an establishment and ran into 

a pole.  Patient fell sustaining injury to his nasal area and to a lesser so in 

his right forehead area.  Patient sustained a laceration to the nose.  Patient 

denying any other injuries.  Denies any vision or hearing disturbance.  No 

dizziness.  No neck pain.  No headache.  Patient is not on blood thinners.  Has 

no bleeding disorders.  Has had no vomiting.  No loss of consciousness.





No headache, no fever or chills, no changes in vision or hearing, no sore throat

or difficulty with speech, no neck pain, no chest pain or shortness of breath, 

no abdominal pain, no nausea or vomiting, no changes in urination or bowel 

movements, no numbness or tingling, no extremity pain, no skin rashes or 

lesions.





Past medical, surgical, social, and family history reviewed.





- Related Data


                                Home Medications











 Medication  Instructions  Recorded  Confirmed


 


Lisinopril-Hctz 20-25 mg 1 tab PO QAM 06/06/17 07/19/21





[Zestoretic 20-25]   


 


Atorvastatin [Lipitor] 40 mg PO DAILY 12/10/18 07/19/21


 


Cholecalciferol [Vitamin D3] 5,000 unit PO DAILY 12/10/18 07/19/21


 


Multivitamin [Men's Multi-Vitamin] 1 each PO DAILY 12/10/18 07/19/21


 


Biotin 10,000 mcg PO DAILY 07/15/21 07/19/21


 


Cyanocobalamin [Vitamin B-12] 500 mcg PO DAILY 07/15/21 07/19/21


 


Omega-3 Fatty Acids [Omega-3] 2,000 mg PO DAILY 07/15/21 07/19/21


 


Turmeric Root Extract [Turmeric] 2,000 mg PO DAILY 07/15/21 07/19/21


 


amLODIPine BESYLATE [Norvasc] 2.5 mg PO DAILY 07/15/21 07/19/21








                                  Previous Rx's











 Medication  Instructions  Recorded


 


Cephalexin [Keflex] 500 mg PO Q8H #21 cap 09/16/22











                                    Allergies











Allergy/AdvReac Type Severity Reaction Status Date / Time


 


No Known Allergies Allergy   Verified 09/16/22 03:12














Review of Systems


ROS Statement: 


Those systems with pertinent positive or pertinent negative responses have been 

documented in the HPI.





ROS Other: All systems not noted in ROS Statement are negative.





Past Medical History


Past Medical History: Cancer, Eye Disorder, GERD/Reflux, Hearing Disorder / 

Deafness, Hyperlipidemia, Hypertension, Osteoarthritis (OA)


Additional Past Medical History / Comment(s): hx. skin cancer, cataracts


History of Any Multi-Drug Resistant Organisms: None Reported


Past Surgical History: Back Surgery, Hernia Repair, Orthopedic Surgery


Additional Past Surgical History / Comment(s): HERNIA REPAIR X 2, right rotator 

cuff repair, cataract removed left eye, colonoscopy,EGD,.  L4-L5 WITH RODS AND 

SCREWS.  BILAT ELBOW REPAIR


Past Anesthesia/Blood Transfusion Reactions: Motion Sickness


Additional Past Anesthesia/Blood Transfusion Reaction / Comment(s): Daughter had

 strokes during open heart surgery.


Past Psychological History: No Psychological Hx Reported


Smoking Status: Never smoker


Past Alcohol Use History: None Reported


Past Drug Use History: None Reported





- Past Family History


  ** Mother


Family Medical History: Deep Vein Thrombosis (DVT)





  ** Father


Family Medical History: Cancer


Additional Family Medical History / Comment(s): lung cancer





General Exam


General appearance: alert, in no apparent distress


Head exam: Present: other (Small abrasion to the right forehead.  Superficial.  

No foreign body.  No step-off.  No crepitus.  Head is no silicate dramatic 

otherwise.)


Eye exam: Present: normal appearance, PERRL, EOMI.  Absent: scleral icterus, 

conjunctival injection, periorbital swelling


ENT exam: Present: normal oropharynx, normal external ear exam.  Absent: mucous 

membranes dry, mucous membranes moist


Neck exam: Present: normal inspection.  Absent: tenderness, meningismus, 

lymphadenopathy


Respiratory exam: Present: normal lung sounds bilaterally.  Absent: respiratory 

distress, wheezes


Cardiovascular Exam: Present: regular rate, normal rhythm, normal heart sounds


GI/Abdominal exam: Present: soft.  Absent: tenderness


Extremities exam: Present: normal inspection, full ROM, normal capillary refill.

  Absent: tenderness, pedal edema, joint swelling, calf tenderness


Back exam: Present: normal inspection, full ROM.  Absent: tenderness


Neurological exam: Present: alert, oriented X3, CN II-XII intact, normal gait.  

Absent: motor sensory deficit


Psychiatric exam: Present: normal affect, normal mood


Skin exam: Present: warm, dry, normal color.  Absent: rash, cyanosis





Course





                                   Vital Signs











  09/16/22





  03:08


 


Temperature 98.7 F


 


Pulse Rate 67


 


Respiratory 19





Rate 


 


Blood Pressure 133/91


 


O2 Sat by Pulse 99





Oximetry 














Procedures





- Laceration


  ** Laceration #1


Consent Obtained: verbal consent


Indication: laceration


Site: face (Nasal area)


Size (cm): 4


Description: stellate


Depth: simple, single layer


Anesthetic Used: lidocaine 1%


Anesthesia Technique: local infiltration


Pre-repair: wound explored, irrigated extensively, deep structures intact, wound

 margins revised


Type of Sutures: nylon


Size of Sutures: 5-0


Number of Sutures: 9


Technique: simple, interrupted


Patient Tolerated Procedure: well, no complications





Medical Decision Making





- Medical Decision Making





Patient presented after a mechanical fall.  Patient sustained a significant 

stellate laceration to his nose which was repaired.  CT of the brain, cervical 

spine, and facial area showed no evidence of fracture.  However cartilaginous 

injury to the nose.  There was no septal hematoma.  When the cover the patient 

with antibiotics.  Suture removal in 5 days.  Discussed signs and symptoms of 

infection.  Discussed wound care.  Discussed head injury instructions.  Patient 

released with his significant other.





Patient was told to return to the ER for any signs or symptoms worsen.  Told to 

return immediately if any other problems arise.  All questions answered.  

Treatment plan discussed.  Patient in agreement


Every effort has been made to ensure accuracy of this dictation.  However, due 

to the limitations of electronic medical records and dictation devices, errors 

in charting still occur.





The case was discussed in detail with ED attending physician.  Presentation, 

findings, treatment plan discussed in detail.


Supervisor Dr. Segundo





- Radiology Data


Radiology results: report reviewed, image reviewed





Disposition


Clinical Impression: 


 Closed head injury, Nasal laceration





Disposition: HOME SELF-CARE


Condition: Good


Instructions (If sedation given, give patient instructions):  Fall Prevention 

for Older Adults (ED), Care For Your Stitches (ED), Laceration (ED)


Additional Instructions: 


Suture removal in 5 days.  Take the antibiotic as directed. Follow-up with your 

regular physician as directed.  Return to the ER immediately if any symptoms 

worsen, new symptoms arise, or any other problems develop.


Is patient prescribed a controlled substance at d/c from ED?: No


Referrals: 


[facility] [Primary Care Provider] - 09/19/22


Time of Disposition: 04:14

## 2022-11-29 ENCOUNTER — HOSPITAL ENCOUNTER (OUTPATIENT)
Dept: HOSPITAL 47 - RADMRIMAIN | Age: 72
Discharge: HOME | End: 2022-11-29
Attending: ORTHOPAEDIC SURGERY
Payer: OTHER GOVERNMENT

## 2022-11-29 DIAGNOSIS — M75.112: Primary | ICD-10-CM

## 2022-11-29 DIAGNOSIS — M25.412: ICD-10-CM

## 2022-11-29 DIAGNOSIS — M19.012: ICD-10-CM

## 2022-11-30 NOTE — MR
EXAMINATION TYPE: MR shoulder LT wo con

 

DATE OF EXAM: 11/29/2022

 

COMPARISON: None

 

HISTORY: ROTATOR-CUFF TEAR/RUPTRE OF LEFT SHOULDER

 

Multiplanar multiecho imaging of the left shoulder performed with no contrast.

 

There is 1.5 cm area of increased fluid signal at the greater tuberosity of the humerus and consisten
t with bone bruise and degenerative cyst formation. There is mild shoulder joint effusion. The subsca
pularis tendon is intact. Glenoid alondra appear intact. The biceps tendon is intact. There is mild hyp
ertrophic spurring at the AC joint. There is some thickening and increased signal in the supraspinatu
s tendon near the attachment on the greater tuberosity. There is a large full-thickness tear of the s
upraspinatus tendon anteriorly. The infraspinatus tendon appears intact. There is mild subdeltoid eff
usion.

 

IMPRESSION:

Full-thickness tear of the supraspinatus tendon at the greater tuberosity without retraction. Mild sh
oulder joint effusion and subdeltoid effusion. There is some osteoarthritic changes at the AC joint.

## 2023-06-13 ENCOUNTER — HOSPITAL ENCOUNTER (OUTPATIENT)
Dept: HOSPITAL 47 - RADMRIMAIN | Age: 73
Discharge: HOME | End: 2023-06-13
Attending: ORTHOPAEDIC SURGERY
Payer: OTHER GOVERNMENT

## 2023-06-13 DIAGNOSIS — M75.122: Primary | ICD-10-CM

## 2023-06-18 NOTE — MR
EXAMINATION TYPE: MR shoulder LT wo con

 

DATE OF EXAM: 6/13/2023

 

COMPARISON: 11/29/2022

 

HISTORY: 72-year-old male M75.122, LEFT ROTATOR CUFF TEAR X1 YEAR AGO

 

TECHNIQUE: Multiplanar, multisequence imaging of the left shoulder is performed without contrast.

 

FINDINGS:

The long head biceps tendon is no longer seen, likely torn and retracted.

 

Progression in now complete tear and retraction of the subscapularis tendon.

 

Moderate degenerative change at the AC joint. There is some chronically fragmented spur or loose body
 superiorly.

 

Redemonstrated full-thickness tear of the anterior to mid supraspinatus tendon measuring 2.1 cm AP an
d retraction by 4.1 cm to the level of the AC joint. Likely limited further retraction of both the alvarado
praspinatus tendon fibers as well as the torn subscapularis tendon due to a prominent rotator cable.

 

Diffuse heterogeneous signal of the remainder of the supraspinatus and infraspinatus tendons.

 

There is bunching up and edema of the subscapularis muscle belly. Minimal fatty streaks within both s
upraspinatus and infraspinatus muscle bellies.

 

Mild effusion in the subacromial/subdeltoid bursa contiguous with the glenohumeral joint.

 

Mild degenerative spurring in the glenohumeral joint with mild diffuse cartilage thinning. There is d
egenerative signal within the superior labrum and degenerative blunting of the posterior labrum.

 

No Hill-Sachs deformity or os acromiale. No suspicious bone marrow replacement.

 

 

IMPRESSION: 

 

1. Redemonstrated large full-thickness tear of the anterior to mid supraspinatus tendon measuring 2.1
 cm AP. Stump retracted to the level of the AC joint (approximately 4.1 cm).

2. Progression now with complete tear of the subscapularis tendon. Both supraspinatus tendon and subs
capularis tendon fibers likely not retracted further due to the presence of a large rotator cable. Ex
tensive edema within the subscapularis muscle belly likely reflecting a more recent injury.

3. The long head biceps tendon is no longer seen, likely torn and retracted in the interval.

## 2023-07-05 ENCOUNTER — HOSPITAL ENCOUNTER (OUTPATIENT)
Dept: HOSPITAL 47 - RADMRIMAIN | Age: 73
Discharge: HOME | End: 2023-07-05
Attending: CLINIC/CENTER
Payer: OTHER GOVERNMENT

## 2023-07-05 DIAGNOSIS — M47.816: ICD-10-CM

## 2023-07-05 DIAGNOSIS — M48.061: ICD-10-CM

## 2023-07-05 DIAGNOSIS — M51.36: Primary | ICD-10-CM

## 2023-07-05 PROCEDURE — 72148 MRI LUMBAR SPINE W/O DYE: CPT

## 2023-07-06 NOTE — MR
EXAMINATION TYPE: MR lumbar spine wo con

 

DATE OF EXAM: 7/5/2023 8:39 PM

 

COMPARISON: 6/9/2017 CT.

 

CLINICAL INDICATION: Male, 72 years old with history of M51.9; Low back pain.

 

TECHNIQUE:  Multi planar, multi sequence imaging was performed utilizing: T1-weighted, T2-weighted, a
nd turbo inversion recovery imaging of the lumbar spine. 

IV Contrast: cc . None.

 

FINDINGS:  

Alignment: The lumbar vertebral bodies have preserved heights with increased lordosis of the lumbar s
pine. Grade 1 anterolisthesis of L3 on L4. Lumbarization of the S1 vertebrae with transitional verteb
niyah at S1-S2. Additional grade 1 anterolisthesis of T11 on T12 is present.

 

Cord: The conus medullaris and the distal spinal cord appear unremarkable with regards to their signa
l intensity and morphology. 

 

Bones/Discs: Is significant degeneration changes with disc space narrowing, osteophyte formation, Mod
ic endplate changes are seen throughout the spine. Fixation hardware at L4 and L5 is present. The bon
y edema involving the adjoining endplates of L1 and L2.

 

T11-T12: Grade 1 anterolisthesis with mild spinal canal stenosis. Moderate bilateral neural foraminal
 stenosis.

 

T12-L1: There may be a small left central/foraminal protrusion series 601 image 33 without significan
t spinal canal or neural foraminal stenosis. Moderate bilateral neural foraminal stenosis. Secondary 
to facet joint arthropathy.

 

L1-L2: Disc bulge and facet joint arthropathy result in mild spinal canal and moderate right and mode
rate severe left neural foraminal stenosis. 

 

L2-L3: Disc bulge and facet joint arthropathy result in moderate to severe spinal canal and severe ri
ght and moderate left neural foraminal stenosis. 

 

L3-L4: Disc bulge and facet joint arthropathy result in moderate to severe spinal canal and moderate 
to severe bilateral neural foraminal stenosis. 

 

L4-L5: The susceptibility limits evaluation at this level. Disc uncovering from grade 1 anterolisthes
is and facet joint arthropathy with mild spinal canal stenosis and moderate to severe right and moder
ate left foraminal stenosis. 

 

L5-S1: Susceptibility artifact limits evaluation at this level. There is facet joint arthropathy with
 at least moderate spinal canal stenosis. Moderate to severe bilateral neural foraminal stenosis.

No significant spinal canal or neural foraminal stenosis in the remainder of the visualized levels.

 

Other findings:  Right simple appearing renal cysts.

 

IMPRESSION:

Severe disc degeneration with associated osteoarthritic changes with multilevel neural foraminal sten
osis and spinal canal stenosis as described above.

## 2023-08-21 ENCOUNTER — HOSPITAL ENCOUNTER (OUTPATIENT)
Dept: HOSPITAL 47 - PNWHC3 | Age: 73
End: 2023-08-21
Attending: ANESTHESIOLOGY
Payer: OTHER GOVERNMENT

## 2023-08-21 VITALS
SYSTOLIC BLOOD PRESSURE: 124 MMHG | TEMPERATURE: 98.7 F | HEART RATE: 59 BPM | RESPIRATION RATE: 15 BRPM | DIASTOLIC BLOOD PRESSURE: 78 MMHG

## 2023-08-21 DIAGNOSIS — I10: ICD-10-CM

## 2023-08-21 DIAGNOSIS — M19.90: ICD-10-CM

## 2023-08-21 DIAGNOSIS — M96.1: ICD-10-CM

## 2023-08-21 DIAGNOSIS — G89.4: ICD-10-CM

## 2023-08-21 DIAGNOSIS — M54.16: Primary | ICD-10-CM

## 2023-08-21 DIAGNOSIS — K21.9: ICD-10-CM

## 2023-08-21 DIAGNOSIS — Z79.899: ICD-10-CM

## 2023-08-21 DIAGNOSIS — E78.5: ICD-10-CM

## 2023-08-21 PROCEDURE — 99211 OFF/OP EST MAY X REQ PHY/QHP: CPT

## 2023-08-21 NOTE — P.PAINPG
PQRS Measure Charge Sheet


Comment: 





HISTORY OF PRESENT ILLNESS:


72 yr old male as a referral from Dr Lockwood presents today w severe and chronic 

LBP x 2.5 yrs secondary to postlaminectomy syndrome for evaluation. Pt states 

pain level is provoked at  /10 in intensity, constant, localized in the lower 

lumbar spine, achy  in character without shooting viviane.  Pain is provoked by 

bending, lifting.  Pain is alleviated by PT in 2018, [facility] 

which were ineffective, repositioning and rest. Oswestry axial pain score at 21 

. 





PMH: OA, Skin CA, Eye Disorder, GERD, Hearing Disorder / Deafness, 

Hyperlipidemia, HTN, OA


PSH: L Cataracts Removal, Hernia Repair x2, R RCT Repair, Colonoscopy/ EGD, L4-

L5 Laminectomy w Hardware, BL Elbow Repair


SH: Negative x3, History of  experience.


FH: Mo- DVT. Fa- Lung CA. Daughter- CAD.


All: See list


Meds: See list





REVIEW OF ORGAN SYSTEMS:


                     CONSTITUTIONAL:  No fevers or chills. No recent weight 

loss.


                     NEUROLOGICAL: +  numbness and tingling along the distal 

extremities. No seizure disorders or headaches.


                     MUSCULOSKELETAL: + pain 


                     PSYCHIATRIC:  Denies current depression or suicidal 

thoughts.


    


Physical Examinations  :


                Constitutional : Cooperative , not in acute distress .          

                                                                                

                                                                                

                                                                                

                                                                                

     


                Neurologic :   Cranial nerve II   to  XII  intact. No focal 

neurological deficits.


                Psychiatric : alert & oriented  x 3. Matching mood & appropriate

affect. Judgment & insight intact. 


                Musculoskeletal :     


                               Cervical Spine 


                                         Motor strength in the deltoid and 

biceps: Normal  right side. Normal  Left side


                                         Motor strength biceps and the wrist 

extensors:   Normal right side . Normal left side 


                                         Motor strength in the triceps muscle: 

Normal right side.  Normal  left side  


                                         Deep tendon reflexes:  Normal at the 

biceps. Normal at Brachioradialis. Normal at triceps


                                         Vertebral body tenderness to deep 

palpation over 


                                         Cervical facet loading test: positive 

bilaterally


                                         Spurling test: positive bilaterally


                                         Neck distraction test: positive 

bilaterally


                                         Hector sign: positive bilaterally


                                  Lumbar spine  L2-L5 vertical incisional scar


                                         Motor strength lower extremities ,thigh

and legs  5/5 Right side ,  5/5  Left side 


                                         Deep tendon reflexes :   Normal  Knee 

Jerk. Normal   Ankle Jerk  


                                         Vertebral body tenderness over 


                                         Sandoval Test positive


                                         Lumbar facet Loading Test: positive 

Right  / positive Left  


                                         Range of motion of the lumbar spine  

Flexion  30 degrees,   extension   10 degrees


                                         Straight Leg Raise test: Left/ Right 

positive at   degree   


                                         Kisha test: positive right /  positive 

left.


                                         Severe tenderness over the Sacroiliac 

joint  on the Right / Left  sides   


                                         Gaenslen  test: positive bilaterally


                                         Seated flexion test: positive 

bilaterally.


                                 Sacral spine :


                                         Severe tenderness over the Sacroiliac 

joint:  right side / left side 


                                         Range of motion: Flexion of the lumbar 

spine <60 degrees


                                         Range of motion: Extension of the 

lumbar spine <20 degrees


                                         Gaenslen's Test positive 


                                         Cedric's Test positive 


                                         Kisha test: positive  right side /  

left side


                                         Thigh Thrust Test


                                         Sacral Thrust Test


Imaging:


MRI without contrast of the lumbar spine from 7/5/23 reviewed





Assessment/ Plan : 


Postlaminectomy syndrome


Recommendation of PT x 6 wks re: M54.16. Behavioral health eval for SCS Trial 

Dx: G89.4, M54.16. Risks, benefits of procedure discussed and patient verbalized

understanding. Admits to aspirin or anti- coagulant use or medical history of 

diabetes. Protocol for discontinuation/ continuation of medications nash 

procedure discussed. Minimal anesthesia provided, if clinically indicated, 

consisting of Versed and Fentanyl. All questions answered.


I have spent greater than 30 minutes on patient care today. Dr Georges was 

available by phone for the evaluation of this patient. The time was used to 

review the medical records including relevant urine studies and Prescription 

history (MAPs), review of the available imaging, evaluation and examination of 

the patient, coordination of care with the medical staff and if applicable 

referring physicians, as well as creation of the medical record





PQRS Narrative: 


                                        





Smoking Status                   Never smoker








Home Medications: 


Ambulatory Orders





Lisinopril-Hctz 20-25 mg [Zestoretic 20-25] 1 tab PO QAM 06/06/17 


Atorvastatin [Lipitor] 40 mg PO DAILY 12/10/18 


Cholecalciferol [Vitamin D3] 5,000 unit PO DAILY 12/10/18 


Multivitamin [Men's Multi-Vitamin] 1 each PO DAILY 12/10/18 


Biotin 10,000 mcg PO DAILY 07/15/21 


Cyanocobalamin [Vitamin B-12] 500 mcg PO DAILY 07/15/21 


Omega-3 Fatty Acids [Omega-3] 2,000 mg PO DAILY 07/15/21 


Turmeric Root Extract [Turmeric] 2,000 mg PO DAILY 07/15/21 


amLODIPine BESYLATE [Norvasc] 2.5 mg PO DAILY 07/15/21 


Cephalexin [Keflex] 500 mg PO Q8H #21 cap 09/16/22 











Controlled Substance Measures





- Controlled Substance Measures


Is patient prescribed a controlled substance at discharge?: No

## 2023-08-30 ENCOUNTER — HOSPITAL ENCOUNTER (OUTPATIENT)
Dept: HOSPITAL 47 - PNWHC3 | Age: 73
End: 2023-08-30
Attending: ANESTHESIOLOGY
Payer: OTHER GOVERNMENT

## 2023-08-30 DIAGNOSIS — M54.16: Primary | ICD-10-CM

## 2023-08-30 DIAGNOSIS — G89.4: ICD-10-CM

## 2023-08-30 PROCEDURE — 99211 OFF/OP EST MAY X REQ PHY/QHP: CPT
